# Patient Record
Sex: MALE | Race: OTHER | HISPANIC OR LATINO | URBAN - METROPOLITAN AREA
[De-identification: names, ages, dates, MRNs, and addresses within clinical notes are randomized per-mention and may not be internally consistent; named-entity substitution may affect disease eponyms.]

---

## 2020-12-15 ENCOUNTER — HOSPITAL ENCOUNTER (EMERGENCY)
Facility: HOSPITAL | Age: 30
Discharge: HOME | End: 2020-12-15
Attending: INTERNAL MEDICINE

## 2020-12-15 VITALS
HEART RATE: 87 BPM | TEMPERATURE: 98 F | OXYGEN SATURATION: 97 % | HEIGHT: 71 IN | WEIGHT: 155 LBS | SYSTOLIC BLOOD PRESSURE: 130 MMHG | DIASTOLIC BLOOD PRESSURE: 90 MMHG | BODY MASS INDEX: 21.7 KG/M2 | RESPIRATION RATE: 18 BRPM

## 2020-12-15 DIAGNOSIS — R52 PAIN: Primary | ICD-10-CM

## 2020-12-15 DIAGNOSIS — S81.012A KNEE LACERATION, LEFT, INITIAL ENCOUNTER: ICD-10-CM

## 2020-12-15 PROBLEM — S01.81XA LACERATION OF FOREHEAD: Status: ACTIVE | Noted: 2020-12-15

## 2020-12-15 PROCEDURE — 12002 RPR S/N/AX/GEN/TRNK2.6-7.5CM: CPT | Mod: LT,ER

## 2020-12-15 PROCEDURE — 25000003 PHARM REV CODE 250: Mod: ER | Performed by: INTERNAL MEDICINE

## 2020-12-15 PROCEDURE — 25000003 PHARM REV CODE 250: Mod: ER | Performed by: NURSE PRACTITIONER

## 2020-12-15 PROCEDURE — 99283 EMERGENCY DEPT VISIT LOW MDM: CPT | Mod: 25,ER

## 2020-12-15 RX ORDER — CEPHALEXIN 500 MG/1
500 CAPSULE ORAL
Status: COMPLETED | OUTPATIENT
Start: 2020-12-15 | End: 2020-12-15

## 2020-12-15 RX ORDER — LIDOCAINE HYDROCHLORIDE 10 MG/ML
10 INJECTION INFILTRATION; PERINEURAL
Status: COMPLETED | OUTPATIENT
Start: 2020-12-15 | End: 2020-12-15

## 2020-12-15 RX ORDER — CEPHALEXIN 500 MG/1
500 CAPSULE ORAL EVERY 12 HOURS
Qty: 20 CAPSULE | Refills: 0 | Status: SHIPPED | OUTPATIENT
Start: 2020-12-15 | End: 2020-12-25

## 2020-12-15 RX ADMIN — LIDOCAINE HYDROCHLORIDE 10 ML: 10 INJECTION, SOLUTION INFILTRATION; PERINEURAL at 09:12

## 2020-12-15 RX ADMIN — CEPHALEXIN 500 MG: 500 CAPSULE ORAL at 09:12

## 2020-12-16 NOTE — DISCHARGE INSTRUCTIONS
Radha un seguimiento con finley médico de atención primaria o atención de urgencia en 7 días para retirar la sutura

## 2020-12-16 NOTE — ED PROVIDER NOTES
Encounter Date: 12/15/2020    SCRIBE #1 NOTE: I, Erendira Monte, am scribing for, and in the presence of,  Dr. Brady . I have scribed the following portions of the note - Other sections scribed: HPI, ROS, PE .       History     Chief Complaint   Patient presents with    Laceration     left knee, a piece of glass furniture fell on leg, UPD on Tetanus     Efraín Mccollum is a 30 y.o. male who presents to the ED complaining of a laceration to the left knee s/p a piece of glass furniture falling on her knee tonight. UTD on tetanus. Bleeding controlled. Denies numbness, weakness, tingling, or gait disturbance. Full ROM.       The history is provided by the patient. No  was used.     Review of patient's allergies indicates:  No Known Allergies  History reviewed. No pertinent past medical history.  History reviewed. No pertinent surgical history.  History reviewed. No pertinent family history.  Social History     Tobacco Use    Smoking status: Never Smoker    Smokeless tobacco: Never Used   Substance Use Topics    Alcohol use: Yes     Frequency: Never     Comment: occasional    Drug use: Never     Review of Systems   Constitutional: Negative for fever.   HENT: Negative for sore throat.    Respiratory: Negative for shortness of breath.    Cardiovascular: Negative for chest pain.   Gastrointestinal: Negative for diarrhea, nausea and vomiting.   Genitourinary: Negative for dysuria.   Musculoskeletal: Positive for arthralgias. Negative for back pain.   Skin: Positive for wound. Negative for rash.   Neurological: Negative for weakness, numbness and headaches.   Psychiatric/Behavioral: Negative for behavioral problems.   All other systems reviewed and are negative.      Physical Exam     Initial Vitals [12/15/20 2005]   BP Pulse Resp Temp SpO2   133/87 87 18 97.6 °F (36.4 °C) 99 %      MAP       --         Physical Exam    Nursing note and vitals reviewed.  Constitutional: He appears well-developed and  well-nourished.   HENT:   Head: Normocephalic and atraumatic.   Eyes: Conjunctivae are normal.   Neck: Normal range of motion. Neck supple.   Cardiovascular: Normal rate, regular rhythm and normal heart sounds. Exam reveals no gallop and no friction rub.    No murmur heard.  Pulmonary/Chest: Breath sounds normal. No respiratory distress. He has no wheezes. He has no rhonchi. He has no rales.   Abdominal: Soft. There is no abdominal tenderness.   Musculoskeletal: Normal range of motion. No edema.      Left knee: He exhibits laceration. He exhibits normal range of motion, no swelling, no effusion, no ecchymosis, no deformity, no erythema, normal alignment, no LCL laxity and normal patellar mobility. No tenderness found.      Comments: 4 cm laceration to the left tknee   Neurological: He is alert and oriented to person, place, and time. GCS score is 15. GCS eye subscore is 4. GCS verbal subscore is 5. GCS motor subscore is 6.   Skin: Skin is warm and dry.   Psychiatric: He has a normal mood and affect.         ED Course   Lac Repair    Date/Time: 12/15/2020 9:40 PM  Performed by: Riley Brady MD  Authorized by: Riley Brady MD     Consent:     Consent obtained:  Verbal    Consent given by:  Patient    Risks discussed:  Pain and infection    Alternatives discussed:  No treatment  Anesthesia (see MAR for exact dosages):     Anesthesia method:  Local infiltration    Local anesthetic:  Lidocaine 1% w/o epi  Laceration details:     Location:  Leg    Leg location:  L knee    Length (cm):  4  Repair type:     Repair type:  Simple  Pre-procedure details:     Preparation:  Patient was prepped and draped in usual sterile fashion  Exploration:     Hemostasis achieved with:  Direct pressure    Wound extent: no foreign bodies/material noted, no muscle damage noted, no nerve damage noted, no tendon damage noted, no underlying fracture noted and no vascular damage noted      Contaminated: no    Treatment:     Area cleansed  with:  Hibiclens    Irrigation solution:  Sterile saline    Irrigation method:  Tap    Visualized foreign bodies/material removed: no    Skin repair:     Repair method:  Sutures    Suture size:  5-0    Wound skin closure material used: ethilon     Suture technique:  Simple interrupted    Number of sutures:  5  Approximation:     Approximation:  Close  Post-procedure details:     Patient tolerance of procedure:  Tolerated well, no immediate complications      Labs Reviewed - No data to display       Imaging Results          X-Ray Knee 1 or 2 View Left (Final result)  Result time 12/15/20 20:31:28   Procedure changed from X-Ray Knee 3 View Left     Final result by Elenita De La Torre MD (12/15/20 20:31:28)                 Impression:      No acute osseous abnormality identified.      Electronically signed by: Elenita De La Torre MD  Date:    12/15/2020  Time:    20:31             Narrative:    EXAMINATION:  XR KNEE 1 OR 2 VIEW LEFT    CLINICAL HISTORY:  glass cut knee; Pain, unspecified    TECHNIQUE:  AP, lateral, and Merchant views of the left knee were performed.    COMPARISON:  None    FINDINGS:  No evidence of acute displaced fracture, dislocation, or osseous destructive process.  Joint spaces are preserved.  No significant suprapatellar joint effusion.  Mild soft tissue swelling is seen at the anterior aspect of the knee.                                 Medical Decision Making:   Initial Assessment:   Efraín Mccollum is a 30 y.o. male who presents to the ED complaining of a laceration to the left knee s/p a piece of glass furniture falling on her knee tonight. UTD on tetanus. Bleeding controlled. Denies numbness, weakness, tingling, or gait disturbance. Full ROM.   Clinical Tests:   Radiological Study: Ordered and Reviewed  ED Management:  Patient tolerated laceration repair and received Keflex in the emergency department as well as a prescription for Keflex.  He was advised to follow up with primary care physician in 7 days  for suture removal/return to the emergency department condition worsens.  Wound care instructions were given prior to discharge.            Scribe Attestation:   Scribe #1: I performed the above scribed service and the documentation accurately describes the services I performed. I attest to the accuracy of the note.    This document was produced by a scribe under my direction and in my presence. I agree with the content of the note and have made any necessary edits.     Dr. Brady    12/16/2020 3:26 AM                    Clinical Impression:       ICD-10-CM ICD-9-CM   1. Pain  R52 780.96   2. Knee laceration, left, initial encounter  S81.012A 891.0                          ED Disposition Condition    Discharge Stable        ED Prescriptions     Medication Sig Dispense Start Date End Date Auth. Provider    cephALEXin (KEFLEX) 500 MG capsule Take 1 capsule (500 mg total) by mouth every 12 (twelve) hours. for 10 days 20 capsule 12/15/2020 12/25/2020 Riley Brady MD        Follow-up Information    None                                      Riley Brady MD  12/16/20 0331

## 2020-12-16 NOTE — ED TRIAGE NOTES
Pt presents to the ER with c/o laceration to right knee after a glass table fell on him. Pt has 3 cm long laceration to left knee. Bleeding controlled

## 2020-12-16 NOTE — FIRST PROVIDER EVALUATION
Emergency Department TeleTriage Encounter Note      CHIEF COMPLAINT    Chief Complaint   Patient presents with    Laceration     left knee, a piece of glass furniture fell on leg, UPD on Tetanus       VITAL SIGNS   Initial Vitals [12/15/20 2005]   BP Pulse Resp Temp SpO2   133/87 87 18 97.6 °F (36.4 °C) 99 %      MAP       --            ALLERGIES    Review of patient's allergies indicates:  No Known Allergies    PROVIDER TRIAGE NOTE  This is a teletriage evaluation of a 30 y.o. male presenting to the ED with c/o laceration. Patient does not speak english. Will place orders from triage note.. Initial orders will be placed and care will be transferred to an alternate provider when patient is roomed for a full evaluation. Any additional orders and the final disposition will be determined by that provider.         ORDERS  Labs Reviewed - No data to display    ED Orders (720h ago, onward)    Start Ordered     Status Ordering Provider    12/16/20 0600 12/15/20 2035  Wound care routine - Clean Wound  Daily     Comments: Clean Wound    Ordered EDRE OGLESBY    12/16/20 0600 12/15/20 2035  Wound care routine - Irrigate Wound  Daily     Comments: Irrigate Wound    Ordered EDER OGLESBY    12/16/20 0600 12/15/20 2035  Wound care routine - Sterile Gloves to Bedside  Daily     Comments: Provide Sterile Gloves to Bedside    Ordered EDER OGLESBY    12/15/20 2045 12/15/20 2035  lidocaine HCL 10 mg/ml (1%) injection 10 mL  ED 1 Time      Ordered EDER OGLESBY    12/15/20 2036 12/15/20 2035  Provide suture tray to patient bedside  Once      Ordered EDER OGLESBY    12/15/20 2036 12/15/20 2035  Change dressing - apply dry sterile dressing  Once     Comments: Apply dry sterile dressing.    Ordered EDER OGLESBY    12/15/20 2021 12/15/20 2018  X-Ray Knee 1 or 2 View Left  1 time imaging  Completed  Interpret   Comments: A piece of glass cut his knee    Final result ERON VILLALTA            Virtual Visit Note: The  provider triage portion of this emergency department evaluation and documentation was performed via [employer], a HIPAA-compliant telemedicine application, in concert with a tele-presenter in the room. A face to face patient evaluation with one of my colleagues will occur once the patient is placed in an emergency department room.      DISCLAIMER: This note was prepared with [employer] voice recognition transcription software. Garbled syntax, mangled pronouns, and other bizarre constructions may be attributed to that software system.